# Patient Record
(demographics unavailable — no encounter records)

---

## 2025-02-25 NOTE — ASSESSMENT
[FreeTextEntry1] : htn, hld , nonobstructive CAD- cont meds. check labs. f/u with cardio  L foot pain - refer to [facility] ortho for eval  R shoulder pain - see sports ortho for eval  obesity - Encouraged continued lifestyle modifications including diet and exercise for weight loss   Healthy diet and regular exercise regimen discussed w/ pt.  Screening labs ordered  flu vaccine during flu season recommended, declined today  referral to GI/colorectal for screening colonoscopy   Any questions call office.

## 2025-02-25 NOTE — HEALTH RISK ASSESSMENT
[0] : 2) Feeling down, depressed, or hopeless: Not at all (0) [PHQ-2 Negative - No further assessment needed] : PHQ-2 Negative - No further assessment needed [15-19] : 15-19 [Former] : Former [< 15 Years] : < 15 Years [GRU2Mmdlz] : 0 [ColonoscopyComments] : due, referred to GI

## 2025-02-25 NOTE — HISTORY OF PRESENT ILLNESS
[de-identified] : Pt in office for CPE. Pt has been feeling well. Pt has hx of htn controlled on losartan and hctz. HLD controlled on lipitor. Denies CP, palpitations, dyspnea, n/v. Pt c/o R shoulder pain x 3 years. Pt has pain w/ abduction w ext rotation, worse with throwing baseballs. Pt has hx of cardiac cath in 2015 showed 30% blockage in a coronary artery. Pt sees cardio Dr La Delcid q 6 mos Pt has hx of L foot pain, sees podiatry. 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 Also eliminated soda intake Works as [place] [employer] , 2 children

## 2025-05-05 NOTE — HISTORY OF PRESENT ILLNESS
[FreeTextEntry1] : 05/05/2025: GENNARO ROLDAN is a 50 year old male presenting to the office for an initial evaluation of left foot pain.  He reports that the pain is chronic.  He is having pain left great toe and across his metatarsal heads of the left foot.  The patient is flat-footed.  He presents wearing sneakers.  No specific trauma associated with his pain.  No other complaints.

## 2025-05-05 NOTE — DISCUSSION/SUMMARY
[de-identified] : MRI left foot without contrast ordered to evaluate for stress injury to the metatarsal heads and evaluate first MTPJ of the left great toe.  Patient has chronic pain which is why I believe the MRI is warranted.  In the interim, proper shoewear discussed with good arch supports at all times.  Avoid walking barefoot.  Activity modifications.  Over-the-counter NSAIDs/Tylenol as needed for pain, as directed, as instructed.  Once the MRI is completed, the patient return to office to review.  All of his questions were answered.  He understood and agreed to the treatment course.  The patient is aware that the MRI needs authorization by the insurance company.  The patient is also aware that there are no guarantees that the insurance company will authorize the MRI.

## 2025-05-05 NOTE — PHYSICAL EXAM
[de-identified] : Left foot Physical Examination:   General: Alert and oriented x3.  In no acute distress.  Pleasant in nature with a normal affect.  No apparent respiratory distress. Erythema, Warmth, Rubor: Negative Swelling: Negative   ROM Ankle: 1. Dorsiflexion: 10 degrees 2. Plantarflexion: 40 degrees 3. Inversion: 20 degrees 4. Eversion: 20 degrees   ROM of digits: Normal Pes Planus: Positive bilaterally. Pes Cavus: Negative   Bunion: Positive bilaterally. Tailor's Bunion (Bunionette): Negative Hammer Toe Deformity/Deformities: Negative   Tenderness to Palpation: 1. Heel Pain: Negative 2. Midfoot Pain: Negative 3. First MTP Joint: Positive.  4. Lis Franc Joint: Negative 5. Lateral Malleolus: Negative 6. Medial Malleolus: Negative   Tenderness Metatarsals: 1st MT: Negative 2nd MT: Severe tenderness to palpation when palpating the second metatarsal head. 3rd MT: Severe tenderness to palpation when palpating the third metatarsal head. 4th MT: Negative 5th MT: Negative Base of the 5th MT: Negative   Tendon Pain: 1. Posterior Tibialis: Negative 2. Peroneus Brevis/Longus: Negative   Ligament Pain: 1. Lis Franc Ligament: Negative 2. Plantar Fascia Ligament: Negative 3. ATFL/CFL/PTFL: Negative 4. Deltoid Ligaments: Negative   Stability: 1. Anterior Drawer: Negative 2. Posterior Drawer: Negative   Strength: 5/5 TA/GS/EHL/FHL/EDL/ADD/ABD   Pulses: 2+ DP/PT Pulses   Capillary Refill Toes: <2 seconds   Neuro: Intact motor and sensory throughout   Additional Test: 1. Mckenna's Squeeze Test: Negative 2. Calcaneal Squeeze Test: Negative 3. Peters Test: Negative 4. Tarsal Tunnel Tinel's Sign (Posterior Tibial Nerve Impingement): Negative 5. Single Heel Rise: Negative  [de-identified] : 6 views x-rays bilateral feet reviewed and taken on 2025: No fracture seen.  Bilateral bunion deformities.  Mild hallux rigidus noted.  Old injury seen to the proximal phalanx consistent with a possible avulsion injury left foot, great toe.  Radiology imaging reviewed in office with the patient on 2025 and I agree with the radiologist's impression below.   Office Location: [address] [place] [address] Office Phone: [phone] Office Fax: [phone] Patient Name: Jalil Martinez Patient Phone Number: [phone] Patient ID: [id number] : 1975 Date of Exam: 2024 R. Phys. Name: Cassidy Martines R. Phys. Address: [address] [place] [address], [address] [address] R. Phys. Phone: [phone] Bilateral foot examination.  AP oblique and lateral view of the right and left foot are obtained in weightbearing position.  FINDINGS: Examination revealed unremarkable bony texture, no fracture or dislocation degenerative osteoarthropathy present.  No calcaneal spur identified bilaterally.  IMPRESSION: Unremarkable examination right and left foot.  Signed by: Cornelius Gleason MD Signed Date: 2024 2:43 PM EDT    SIGNED BY: Cornelius Gleason M.D., Ext. [phone] 02:43 PM   Office Location: [address] [place] [address] Office Phone: [phone] Office Fax: [phone] Patient Name: Jalil Martinez Patient Phone Number: [phone] Patient ID: [id number] : 1975 Date of Exam: 2024 R. Phys. Name: Cassidy Martines R. Phys. Address: [address] [place] [address], [place] [id number] R. Phys. Phone: [phone] BILATERAL ANKLE XRAY AP OBLIQUE AND LATERAL  HISTORY: M25.571 Right ankle pain M25.572 Left ankle pain R22.41 Swelling, mass, lump right lower limb  Bilateral ankle pain. M25.571 M25.572  Views: Bilateral AP, lateral and oblique.  Distal bilateral tibia and fibula demonstrate intact cortical margins with no evidence of an acute fracture.  Visualized aspects of the bilateral talus, calcaneus and tarsal bones demonstrate intact cortical margins with no evidence of an acute fracture.  There are no significant degenerative changes. Bilateral ankle mortise are well maintained.  There is no appreciable soft tissue swelling.  There is no calcaneal plantar spur.  IMPRESSION: Normal bilateral ankles.      Signed by: Cornelius Gleason MD Signed Date: 2024 2:43 PM EDT    SIGNED BY: Cornelius Gleason M.D., Ext. [phone] 02:43 PM

## 2025-06-02 NOTE — HISTORY OF PRESENT ILLNESS
[FreeTextEntry1] : 06/02/2025: GENNARO ROLDAN is a 50 year old male presenting to the office for a follow up evaluation and MRI review of his left foot. He reports no improvement in his symptoms since last visit. He continues to endorse pain along the plantar aspect over his metatarsals. He also details sharp, intermittent pain at the distal second toe. The patient presents to the office in sneakers and ambulating without assistance.  05/05/2025: GENNARO ROLDAN is a 50 year old male presenting to the office for an initial evaluation of left foot pain.  He reports that the pain is chronic.  He is having pain left great toe and across his metatarsal heads of the left foot.  The patient is flat-footed.  He presents wearing sneakers.  No specific trauma associated with his pain.  No other complaints.

## 2025-06-02 NOTE — DISCUSSION/SUMMARY
[de-identified] : Today I had a lengthy discussion with the patient regarding their left foot pain. I have addressed all the patient's concerns surrounding the pathology of their condition. I have reviewed the patient's MRI results with them in great detail.  I would like the patient to proceed with conservative management, which was discussed in great detail.    Plan:  1.  In order to provide the patient with a better understanding of their ailment, I educated them about the anatomy, physiology and lifespan of their condition using a foot model. 2.  I recommend that the patient utilize ice, NSAIDS/Tylenol PRN, and heat.  3. A discussion was had about shoe-wear modifications. I advised the patient to utilize a wide toed cross training sneaker that better accommodates the feet. I recommended New Balance, Humphreys, Saucony, or Hoka to the patient. 4. I recommend that the patient utilize meloxicam with food once per day as instructed. A prescription for the meloxicam was ordered for the patient in the office today. I advised GENNARO that the NSAID should be taken with food.  In addition while taking the prescribed NSAID, no over the counter or other NSAIDs should be used, such as ibuprofen (Motrin or Advil) or naproxen (Aleve) as this can cause stomach upset or other side effects.  If needed for fever or breakthrough pain Tylenol can be used. 5.  I recommend that the patient utilize Voltaren gel topically. If the Voltaren gel could not be obtained, Icy Hot, Biofreeze, or Bengay can be utilized instead. 6. I recommend that the patient utilize metatarsal pads. Metatarsal pads were provided in the office today.  7. Patient may continue participating in all physical activities without restrictions, as tolerated.   The patient understood and verbally agreed to the treatment plan. All of their questions were answered, and they were satisfied with the visit. The patient should call the office if they have any questions or experience worsening symptoms.  f/u in 6 weeks

## 2025-06-02 NOTE — PHYSICAL EXAM
[de-identified] : Left foot Physical Examination:   General: Alert and oriented x3.  In no acute distress.  Pleasant in nature with a normal affect.  No apparent respiratory distress. Erythema, Warmth, Rubor: Negative Swelling: Negative   ROM Ankle: 1. Dorsiflexion: 10 degrees 2. Plantarflexion: 40 degrees 3. Inversion: 20 degrees 4. Eversion: 20 degrees   ROM of digits: Normal Pes Planus: Positive bilaterally. Pes Cavus: Negative   Bunion: Positive bilaterally. Tailor's Bunion (Bunionette): Negative Hammer Toe Deformity/Deformities: Negative   Tenderness to Palpation: 1. Heel Pain: Negative 2. Midfoot Pain: Negative 3. First MTP Joint: Positive.  4. Lis Franc Joint: Negative 5. Lateral Malleolus: Negative 6. Medial Malleolus: Negative   Tenderness Metatarsals: 1st MT: Negative 2nd MT: Severe tenderness to palpation when palpating the second metatarsal head. 3rd MT: Severe tenderness to palpation when palpating the third metatarsal head. 4th MT: Negative 5th MT: Negative Base of the 5th MT: Negative   Tendon Pain: 1. Posterior Tibialis: Negative 2. Peroneus Brevis/Longus: Negative   Ligament Pain: 1. Lis Franc Ligament: Negative 2. Plantar Fascia Ligament: Negative 3. ATFL/CFL/PTFL: Negative 4. Deltoid Ligaments: Negative   Stability: 1. Anterior Drawer: Negative 2. Posterior Drawer: Negative   Strength: 5/5 TA/GS/EHL/FHL/EDL/ADD/ABD   Pulses: 2+ DP/PT Pulses   Capillary Refill Toes: <2 seconds   Neuro: Intact motor and sensory throughout   Additional Test: 1. Mckenna's Squeeze Test: Negative 2. Calcaneal Squeeze Test: Negative 3. Peters Test: Negative 4. Tarsal Tunnel Tinel's Sign (Posterior Tibial Nerve Impingement): Negative 5. Single Heel Rise: Negative  [de-identified] : Radiology imaging reviewed in office with the patient on 2025 and I agree with the radiologist's impression below.   Office Location: [address] [place] [address] Office Phone: [phone] Office Fax: [phone] Patient Name: Jalil Martinez Patient Phone Number: [phone] Patient ID: [id number] : 1975 Date of Exam: 2025 R. Phys. Name: Pravin Kendrick R. Phys. Address: [address] [place] [address] R. Phys. Phone: [phone] EXAM: MRI-LEFT FOOT NON CONTRAST  HISTORY: First digit pain.  COMPARISON: No prior studies are available for comparison.  TECHNIQUE: MR imaging of the left foot was performed without IV contrast on a 3.0 Reyna Ultra High Field Wide Bore MRI unit utilizing the following pulse sequences: Short axis proton density with and without fat suppression, long axis STIR, and sagittal proton density.  FINDINGS: Bones/joints: Subcortical cystic change of the medial first metatarsal head is likely related to traction of the insertion of the joint capsule. There is hallux-sesamoid degenerative change with joint space narrowing. There is no marrow or periosseous edema about the metatarsal shafts to suggest the presence of an acute stress injury. No advanced osteoarthritis or periarticular erosive change is seen. A physiologic amount of fluid is present within the joints.  Tendons/muscles: There is insertional tibialis anterior tendinosis and trace tenosynovitis. No full-thickness tendon tear is present. No high-grade muscle atrophy or acute muscle tear is seen.  Ligaments: The Lisfranc ligament is intact. The second metatarsophalangeal plantar plate demonstrates tear medially.  Webspaces: First, second, and third intermetatarsal bursitis is present.  Miscellaneous: The subcutaneous tissues are within normal limits.  IMPRESSION:    1. Likely reactive capsular insertion changes of the medial first metatarsal head. Hallux-sesamoid degenerative change. 2. Insertional tibialis anterior tendinosis and trace tenosynovitis. 3. Second metatarsophalangeal plantar plate tear. First through third intermetatarsal bursitis.    Electronically Signed By: Dena Herrera M.D., on 2025 9:46 AM   SIGNED BY: Dena Herrera M.D., Ext. [phone] 09:46 AM

## 2025-06-02 NOTE — ADDENDUM
[FreeTextEntry1] : I, Chilo Sammy, acted solely as a scribe for Dr. Pravin Kendrick on this date 06/02/2025.   All medical record entries made by the Scribe were at my, Dr. Pravin Kendrick, direction and personally dictated by me on 06/02/2025. I have reviewed the chart and agree that the record accurately reflects my personal performance of the history, physical exam, assessment and plan. I have also personally directed, reviewed, and agreed with the chart.